# Patient Record
Sex: FEMALE | Race: WHITE | NOT HISPANIC OR LATINO
[De-identification: names, ages, dates, MRNs, and addresses within clinical notes are randomized per-mention and may not be internally consistent; named-entity substitution may affect disease eponyms.]

---

## 2023-05-17 ENCOUNTER — NON-APPOINTMENT (OUTPATIENT)
Age: 23
End: 2023-05-17

## 2023-05-18 ENCOUNTER — NON-APPOINTMENT (OUTPATIENT)
Age: 23
End: 2023-05-18

## 2023-05-18 ENCOUNTER — EMERGENCY (EMERGENCY)
Facility: HOSPITAL | Age: 23
LOS: 1 days | Discharge: HOME | End: 2023-05-18
Attending: EMERGENCY MEDICINE | Admitting: EMERGENCY MEDICINE
Payer: COMMERCIAL

## 2023-05-18 VITALS
HEART RATE: 77 BPM | OXYGEN SATURATION: 98 % | WEIGHT: 106.92 LBS | SYSTOLIC BLOOD PRESSURE: 108 MMHG | RESPIRATION RATE: 16 BRPM | DIASTOLIC BLOOD PRESSURE: 72 MMHG | TEMPERATURE: 98 F

## 2023-05-18 VITALS
SYSTOLIC BLOOD PRESSURE: 106 MMHG | DIASTOLIC BLOOD PRESSURE: 66 MMHG | RESPIRATION RATE: 18 BRPM | TEMPERATURE: 99 F | OXYGEN SATURATION: 98 % | HEART RATE: 72 BPM

## 2023-05-18 DIAGNOSIS — R10.12 LEFT UPPER QUADRANT PAIN: ICD-10-CM

## 2023-05-18 DIAGNOSIS — R07.89 OTHER CHEST PAIN: ICD-10-CM

## 2023-05-18 DIAGNOSIS — Z86.79 PERSONAL HISTORY OF OTHER DISEASES OF THE CIRCULATORY SYSTEM: ICD-10-CM

## 2023-05-18 DIAGNOSIS — R00.2 PALPITATIONS: ICD-10-CM

## 2023-05-18 PROBLEM — Z00.00 ENCOUNTER FOR PREVENTIVE HEALTH EXAMINATION: Status: ACTIVE | Noted: 2023-05-18

## 2023-05-18 LAB
ALBUMIN SERPL ELPH-MCNC: 4.8 G/DL — SIGNIFICANT CHANGE UP (ref 3.3–5)
ALP SERPL-CCNC: 46 U/L — SIGNIFICANT CHANGE UP (ref 40–120)
ALT FLD-CCNC: 13 U/L — SIGNIFICANT CHANGE UP (ref 10–45)
ANION GAP SERPL CALC-SCNC: 9 MMOL/L — SIGNIFICANT CHANGE UP (ref 5–17)
AST SERPL-CCNC: 13 U/L — SIGNIFICANT CHANGE UP (ref 10–40)
BASOPHILS # BLD AUTO: 0.04 K/UL — SIGNIFICANT CHANGE UP (ref 0–0.2)
BASOPHILS NFR BLD AUTO: 0.6 % — SIGNIFICANT CHANGE UP (ref 0–2)
BILIRUB SERPL-MCNC: 0.4 MG/DL — SIGNIFICANT CHANGE UP (ref 0.2–1.2)
BUN SERPL-MCNC: 10 MG/DL — SIGNIFICANT CHANGE UP (ref 7–23)
CALCIUM SERPL-MCNC: 9.6 MG/DL — SIGNIFICANT CHANGE UP (ref 8.4–10.5)
CHLORIDE SERPL-SCNC: 102 MMOL/L — SIGNIFICANT CHANGE UP (ref 96–108)
CO2 SERPL-SCNC: 25 MMOL/L — SIGNIFICANT CHANGE UP (ref 22–31)
CREAT SERPL-MCNC: 0.75 MG/DL — SIGNIFICANT CHANGE UP (ref 0.5–1.3)
EGFR: 116 ML/MIN/1.73M2 — SIGNIFICANT CHANGE UP
EOSINOPHIL # BLD AUTO: 0.14 K/UL — SIGNIFICANT CHANGE UP (ref 0–0.5)
EOSINOPHIL NFR BLD AUTO: 2 % — SIGNIFICANT CHANGE UP (ref 0–6)
GLUCOSE SERPL-MCNC: 96 MG/DL — SIGNIFICANT CHANGE UP (ref 70–99)
HCG SERPL-ACNC: <0 MIU/ML — SIGNIFICANT CHANGE UP
HCT VFR BLD CALC: 37.6 % — SIGNIFICANT CHANGE UP (ref 34.5–45)
HGB BLD-MCNC: 13.2 G/DL — SIGNIFICANT CHANGE UP (ref 11.5–15.5)
IMM GRANULOCYTES NFR BLD AUTO: 0.1 % — SIGNIFICANT CHANGE UP (ref 0–0.9)
LYMPHOCYTES # BLD AUTO: 2.3 K/UL — SIGNIFICANT CHANGE UP (ref 1–3.3)
LYMPHOCYTES # BLD AUTO: 32.3 % — SIGNIFICANT CHANGE UP (ref 13–44)
MAGNESIUM SERPL-MCNC: 1.8 MG/DL — SIGNIFICANT CHANGE UP (ref 1.6–2.6)
MCHC RBC-ENTMCNC: 31.1 PG — SIGNIFICANT CHANGE UP (ref 27–34)
MCHC RBC-ENTMCNC: 35.1 GM/DL — SIGNIFICANT CHANGE UP (ref 32–36)
MCV RBC AUTO: 88.5 FL — SIGNIFICANT CHANGE UP (ref 80–100)
MONOCYTES # BLD AUTO: 0.69 K/UL — SIGNIFICANT CHANGE UP (ref 0–0.9)
MONOCYTES NFR BLD AUTO: 9.7 % — SIGNIFICANT CHANGE UP (ref 2–14)
NEUTROPHILS # BLD AUTO: 3.94 K/UL — SIGNIFICANT CHANGE UP (ref 1.8–7.4)
NEUTROPHILS NFR BLD AUTO: 55.3 % — SIGNIFICANT CHANGE UP (ref 43–77)
NRBC # BLD: 0 /100 WBCS — SIGNIFICANT CHANGE UP (ref 0–0)
PLATELET # BLD AUTO: 260 K/UL — SIGNIFICANT CHANGE UP (ref 150–400)
POTASSIUM SERPL-MCNC: 3.7 MMOL/L — SIGNIFICANT CHANGE UP (ref 3.5–5.3)
POTASSIUM SERPL-SCNC: 3.7 MMOL/L — SIGNIFICANT CHANGE UP (ref 3.5–5.3)
PROT SERPL-MCNC: 7.2 G/DL — SIGNIFICANT CHANGE UP (ref 6–8.3)
RBC # BLD: 4.25 M/UL — SIGNIFICANT CHANGE UP (ref 3.8–5.2)
RBC # FLD: 11.9 % — SIGNIFICANT CHANGE UP (ref 10.3–14.5)
SODIUM SERPL-SCNC: 136 MMOL/L — SIGNIFICANT CHANGE UP (ref 135–145)
TROPONIN T SERPL-MCNC: <0.01 NG/ML — SIGNIFICANT CHANGE UP (ref 0–0.01)
WBC # BLD: 7.12 K/UL — SIGNIFICANT CHANGE UP (ref 3.8–10.5)
WBC # FLD AUTO: 7.12 K/UL — SIGNIFICANT CHANGE UP (ref 3.8–10.5)

## 2023-05-18 PROCEDURE — 99283 EMERGENCY DEPT VISIT LOW MDM: CPT | Mod: 25

## 2023-05-18 PROCEDURE — 83735 ASSAY OF MAGNESIUM: CPT

## 2023-05-18 PROCEDURE — 36415 COLL VENOUS BLD VENIPUNCTURE: CPT

## 2023-05-18 PROCEDURE — 99285 EMERGENCY DEPT VISIT HI MDM: CPT

## 2023-05-18 PROCEDURE — 71046 X-RAY EXAM CHEST 2 VIEWS: CPT | Mod: 26

## 2023-05-18 PROCEDURE — 80053 COMPREHEN METABOLIC PANEL: CPT

## 2023-05-18 PROCEDURE — 71046 X-RAY EXAM CHEST 2 VIEWS: CPT

## 2023-05-18 PROCEDURE — 84484 ASSAY OF TROPONIN QUANT: CPT

## 2023-05-18 PROCEDURE — 85025 COMPLETE CBC W/AUTO DIFF WBC: CPT

## 2023-05-18 PROCEDURE — 84702 CHORIONIC GONADOTROPIN TEST: CPT

## 2023-05-18 RX ORDER — CYCLOBENZAPRINE HYDROCHLORIDE 10 MG/1
5 TABLET, FILM COATED ORAL ONCE
Refills: 0 | Status: COMPLETED | OUTPATIENT
Start: 2023-05-18 | End: 2023-05-18

## 2023-05-18 RX ORDER — CYCLOBENZAPRINE HYDROCHLORIDE 10 MG/1
1 TABLET, FILM COATED ORAL
Qty: 12 | Refills: 0
Start: 2023-05-18 | End: 2023-05-21

## 2023-05-18 RX ADMIN — CYCLOBENZAPRINE HYDROCHLORIDE 5 MILLIGRAM(S): 10 TABLET, FILM COATED ORAL at 17:17

## 2023-05-18 NOTE — ED PROVIDER NOTE - OBJECTIVE STATEMENT
22F PMH PFO, atrial septal aneurysm, p/w palpitations. Pt describes episode of occasional extra beat/very brief strong "pounding of her heart" - symptoms last a splint second then resolve. Has been intermittent for > 6 months, had followed up w/ cardiologist in [place] where echo ~6months ago reportedly showed PFO and atrial septal aneurysm and was started on propranolol PRN. Today pt also felt new pain to LUQ/L lower chest - describes it as a spasm/tightening sensation - pain lasts for 1-2 seconds then resolves, intermittent since today. No other systemic symptoms.   Denies associated SOB, nausea, vomiting, diarrhea, lightheaded, diaphoresis, cough, rhinorrhea, black stool, bloody stool, LE pain, LE swelling, focal weakness/numbness, recent travel/immobilization, abd pain, urinary complaints, f/c. No hormone use. No FMH CAD/clots/sudden death.

## 2023-05-18 NOTE — ED ADULT NURSE NOTE - NSFALLUNIVINTERV_ED_ALL_ED
Bed/Stretcher in lowest position, wheels locked, appropriate side rails in place/Call bell, personal items and telephone in reach/Instruct patient to call for assistance before getting out of bed/chair/stretcher/Non-slip footwear applied when patient is off stretcher/[place] to call system/Physically safe environment - no spills, clutter or unnecessary equipment/Purposeful proactive rounding/Room/bathroom lighting operational, light cord in reach

## 2023-05-18 NOTE — ED PROVIDER NOTE - NSFOLLOWUPINSTRUCTIONS_ED_ALL_ED_FT
Stay well hydrated.      Return for fevers, persistent vomit, uncontrolled pain, worsening breathing, worsening lightheaded.    Follow up with primary doctor within 1-2 days.     Follow up with cardiologist.     Palpitations    A palpitation is the feeling that your heartbeat is irregular or is faster than normal. It may feel like your heart is fluttering or skipping a beat. They may be caused by many things, including smoking, caffeine, alcohol, stress, and certain medicines. Although most causes of palpitations are not serious, palpitations can be a sign of a serious medical problem. Avoid caffeine, alcohol, and tobacco products at home. Try to reduce stress and anxiety and make sure to get plenty of rest.     SEEK IMMEDIATE MEDICAL CARE IF YOU HAVE ANY OF THE FOLLOWING SYMPTOMS: chest pain, shortness of breath, severe headache, dizziness/lightheadedness, or fainting.     Follow up with cardiologist. Can call [phone] (HEART BEAT) to schedule appointment.    Can also call the following offices:    Dr. Carline Barragan, Dr. Den Early  [phone]  [address]  [place], [place]    Dr. Amor Jacob  [phone]  [address], [address]  [place], [place]    Dr. Ean Zacarias  [phone]    [address], [address] [place], [place]  [phone]  Dr. Jared Dale, Dr. Ant Mir, Dr. Nicole Roberts, Dr. Jared Shafer, Dr. Kai Santos, Dr. Jesús Garcia, Dr. Anabell Pham, Dr. Amor Jacob    [address]  [place], [place]  [phone]  Dr. Oskar Arnett, Dr. Shilpa Winters, Dr. Janene Malhotra, Dr. Ean Zacarias, Dr. Claudia Jasso, Dr. Jaylen Sanchez, Dr. Francine Gandhi    [address], 4th floor  [place], [place]  [phone]  Dr. Thien Sanchez, Dr. Jose Jamison, Dr. Brian Martins, Dr. Jesús Cruz, Dr. Den Early, Dr. Imtiaz Briscoe    [address], [address] [facility]  [phone]  Dr. Sixto Sharma, Dr. Sari Davila, Dr. Wilson Chowdhury, Dr. Janene Malhotra, Dr. Luiz Long, Dr. Jaylen Sanchez, Dr. Lucille Shah, Dr. Avery Barxton, Dr. Maggie Shah    [phone]  Dr. Sky Huynh    [phone]  Dr. Renetta Garcia, Dr. Jesús Garcia    [address]  [place], [place]  [phone]  Dr. Carline Barragan    [phone]  Dr. Chan Abernathy, Dr. Percy Cole, Dr. Ariela Kaye, Dr. Harpreet Anderson, Dr. Sunshine Dove, Dr. Andie Ramos, Dr. Claudia Nagy, Dr. Oskar Rosales, Dr. Anabell Phma    [phone]  Dr. Alex Conrad, Dr. Avery Braxton, Dr. Maggie Shah    [address] [facility] [address], [place]  [phone]  Dr. Petr Morrell, Dr. Fernando Schmidt, Dr. Elpidio Bingham, Dr. Ethan Rush    [address] [place], [place]  [phone]  Dr. Petr Morrell    [address] [place], [place]  [phone]  Dr. Jesús Garcia    [address]  [place], [place]  [phone]  Dr. Elpidio Bingham    [address] [place], [place]  [phone]  Dr. Francine Gandhi    [address] [place] [address]  [phone]  Dr. Jesús Garcia    [address] [place], [place]  [phone]  Dr. Jared Dale    [address]  [place], [place]  [phone]  Dr. Petr Morrell, Dr. Fernando Schmidt, Dr. Elpidio Bingham, Dr. Ethan Rush    5 [facility] 2nd [place], [place]  [phone]  Dr. Den Early    [address] [place] [address], 3rd floor  [place], [phone]  Dr. Wilson Chowdhury, Dr. Janene Malhotra, Dr. David Barrow    [address] [place], [place]  [phone]  Dr. Petr Morrell, Dr. Fernando Schmidt, Dr. Elpidio Bingham, Dr. Ethan Rush It is unclear what exactly is causing your symptoms! It is important to continue following up with your doctor outside the hospital and to return to ER for: Persistent fever/vomiting, uncontrolled pain, worsening swelling, worsening breathing, worsening lightheaded, spreading redness.     Can take flexeril as prescribed for possible spasm.     Stay well hydrated.    Follow up with primary doctor within 1-2 days.     Follow up with cardiologist as soon as possible.    Palpitations    A palpitation is the feeling that your heartbeat is irregular or is faster than normal. It may feel like your heart is fluttering or skipping a beat. They may be caused by many things, including smoking, caffeine, alcohol, stress, and certain medicines. Although most causes of palpitations are not serious, palpitations can be a sign of a serious medical problem. Avoid caffeine, alcohol, and tobacco products at home. Try to reduce stress and anxiety and make sure to get plenty of rest.     SEEK IMMEDIATE MEDICAL CARE IF YOU HAVE ANY OF THE FOLLOWING SYMPTOMS: chest pain, shortness of breath, severe headache, dizziness/lightheadedness, or fainting.     Follow up with cardiologist. Can call [phone] (HEART BEAT) to schedule appointment.    Can also call the following offices:    Dr. Carline Barragan, Dr. Den Early  [phone]  [address]  [place], [place]    Dr. Amor Jacob  [phone]  [address]  [place], [place]    Dr. Ean Zacarias  [phone]    [address], [address] [place], [place]  [phone]  Dr. Jared Dale, Dr. Ant Mir, Dr. Nicole Roberts, Dr. Jared Shafer, Dr. Kai Santos, Dr. Jesús Garcia, Dr. Anabell Pham, Dr. Amor Jacob    [address], [address]  [place], [place]  [phone]  Dr. Oskar Arnett, Dr. Shilpa Winters, Dr. Janene Malhotra, Dr. Ean Zacarias, Dr. Claudia Jasso, Dr. Jaylen Sanchez, Dr. Francine Gandhi    [address], 4th floor  [place], [place]  [phone]  Dr. Thien Sanchez, Dr. Jose Jamison, Dr. Brian Martins, Dr. Jesús Cruz, Dr. Den Early, Dr. Imtiaz Briscoe    [address]  [phone]  Dr. Sixto Sharma, Dr. Sari Davila, Dr. Wilson Chowdhury, Dr. Janene Malhotra, Dr. Luiz Long, Dr. Jaylen Sanchez, Dr. Lucille Shah, Dr. Avery Braxton, Dr. Maggie Shah    [phone]  Dr. Sky Huynh    [phone]  Dr. Renetta Garcia, Dr. Jesús Garcia    [address]  [place], [place]  [phone]  Dr. Carline Barragan    [phone]  Dr. Chan Abernathy, Dr. Percy Cole, Dr. Ariela Kaye, Dr. Harpreet Anderson, Dr. Sunshine Dove, Dr. Andie Ramos, Dr. Claudia Nagy, Dr. Oskar Rosales, Dr. Aanbell Pham    [phone]  Dr. Alex Conrad, Dr. Avery Braxton, Dr. Maggie Shah    [address] [facility], [address]  [place], [place]  [phone]  Dr. Petr Morrell, Dr. Fernando Schmidt, Dr. Elpidio Bingham, Dr. Ethan Rush    [address] [place], [place]  [phone]  Dr. Petr Morrell    [address]  [place], [place]  [phone]  Dr. Jesús Garcia    [address]  [place], [place]  [phone]  Dr. Elpidio Bingham    [address]  [place], [place]  [phone]  Dr. Francine Gandhi    [address] [place] [address]  [phone]  Dr. Jesús Garcia    [address]  [place], [place]  [phone]  Dr. Jared Dale    [address]  [place], [place]  [phone]  Dr. Petr Morrell, Dr. Fernando Schmidt, Dr. Elpidio Bingham, Dr. Ethan Rush    5 [facility], 2nd floor  [place], [place]  [phone]  Dr. Den Early    [address] [place] [address], 3rd floor  [place], [phone]  Dr. Wilson Chowdhury, Dr. Janene Malhotra, Dr. David Barrow    [address] [place], [place]  [phone]  Dr. Petr Morrell, Dr. Fernando Schmidt, Dr. Elpidio Bingham, Dr. Ethan Rush

## 2023-05-18 NOTE — ED ADULT NURSE NOTE - CHIEF COMPLAINT QUOTE
C/o of chest tightness since this morning, hx of PFO and atrial septal aneurysm.  Took propanolol 10mg 1 hour ago.

## 2023-05-18 NOTE — ED PROVIDER NOTE - CLINICAL SUMMARY MEDICAL DECISION MAKING FREE TEXT BOX
22F PMH PFO, atrial septal aneurysm, p/w palpitations. Pt describes episode of occasional extra beat/very brief strong "pounding of her heart" - symptoms last a splint second then resolve. Has been intermittent for > 6 months, had followed up w/ cardiologist in [place] where echo ~6months ago reportedly showed PFO and atrial septal aneurysm and was started on propranolol PRN. Today pt also felt new pain to LUQ/L lower chest - describes it as a spasm/tightening sensation - pain lasts for 1-2 seconds then resolves, intermittent since today. No other systemic symptoms.   Vitals wnl, exam as above.  ddx: Unclear etiology. Very low suspicion for acute cardiac pathology.   Labs, CXR, attempt symptom control, reassess.

## 2023-05-18 NOTE — ED PROVIDER NOTE - PROGRESS NOTE DETAILS
[place]: Labs, CXR grossly wnl, no significant arrythmia while in ED. Pt states that episodes are much improved after flexeril.  Discussed importance of outpt follow up and return precautions. Clinically no indication for further emergent ED workup or hospitalization at this time. Stable for dc, outpt f/u.

## 2023-05-18 NOTE — ED ADULT NURSE NOTE - OBJECTIVE STATEMENT
Pt presents to the ED c/o cp. Pt has prior cardiac hx of palpitations and endorses taking propanolol when she feels her heart is racing. Pt in NAD, c/o pain 5/10, denies SOB, speaking in full sentences, family at bedside.

## 2023-05-18 NOTE — ED PROVIDER NOTE - PATIENT PORTAL LINK FT
You can access the FollowMyHealth Patient Portal offered by [facility] by registering at the following website: [email or web address]. By joining [facility]’s FollowMyHealth portal, you will also be able to view your health information using other applications (apps) compatible with our system.

## 2023-05-18 NOTE — ED ADULT TRIAGE NOTE - CHIEF COMPLAINT QUOTE
C/o of chest tightness since this morning, hx of PFO. C/o of chest tightness since this morning, hx of PFO and atrial septal aneurysm. C/o of chest tightness since this morning, hx of PFO and atrial septal aneurysm.  Took propanolol 10mg 1 hour ago.

## 2023-07-13 ENCOUNTER — APPOINTMENT (OUTPATIENT)
Dept: HEART AND VASCULAR | Facility: CLINIC | Age: 23
End: 2023-07-13

## 2023-07-13 NOTE — HISTORY OF PRESENT ILLNESS
[FreeTextEntry1] : 22 year female with PMHX of PMHX of PFO, Atrial Septal Aneurysm and FMHX ___ here to establish cardiac care for palpitations. \par \par Patient was recently evaluated at [facility] ER 05/2023 for palpitations and chest pains. Chest dx unremarkable. Trop x1 negative, no ischemic findings on ekg.
